# Patient Record
(demographics unavailable — no encounter records)

---

## 2024-10-09 NOTE — ASSESSMENT
[FreeTextEntry1] : Assessment: Peroneal tendinitis, left foot.  Plan:   After further assessment the left foot was placed in approximately 90 degrees to the ankle with the foot in as close to neutral position as possible.  The foot and ankle were prepped to be clean and dry.  Pre tape spray may have been used on the foot and ankle as indicated.  Pre-tape adhesive spray provides an extra sticky, waterproof barrier, that helps tapes and strapping stick better to the skin. Perfect for oily skin, sweaty skin, water sports and extreme environments. It can be a game-changer when strapping challenging areas such as sweaty or wet ankles & feet. I then applied a strapping to the left foot and ankle for support and compression to help relieve pressure and symptoms related to the patient's foot/ankle problem.   The strapping can aid recovery by supporting the muscles, tendons, or ligaments around the affected painful area, as well as improving blood flow to the area. Plus, the strapping will reduce pain and improve the gait, preventing further injury from poor alignment. The strapping also provides proprioceptive feedback. The strapping can reduce the amount of stretching and moving the ligaments do when the patient is weight bearing. This not only gives tissues a better chance to heal, but it also helps prevent further damage. This strapping also minimizes foot pronation, collapse, or flattening which causes over use and irritation to the muscle, tendons, and ligaments in the foot. Often, relief of pain with the strapping is a diagnostic indication for the need of functional orthotic devices. In general, strappings may be used to treat strains, sprains, dislocations, and some fractures. Strapping the foot and ankle provides the external stabilization that stretched ligaments (tissues connecting bone to bone) need while they heal. Most studies show that strapping the foot and ankle decreases the incidence and severity of the affected area.  Besides physical support, strapping can increase biofeedback and increase proprioception, which is a body's ability to know where it is thus aiding in healing and reducing further exacerbation of the pain. The patient was instructed to leave the strapping in place for the next few days to a week, if it was comfortable.  The patient was advised to keep the strapping dry, but leave it on and dry with a hair dryer if it got wet.   The patient was further instructed that if the strapping was uncomfortable or causes any problems it should be removed right away and the office notified.  The patient was instructed to rest, ice and elevate the foot as much as possible.  The patient was instructed about the potential benefits of physical therapy as a conservative means of treating this condition, either independently, or as an adjunct to other conservative treatment.  The patient was instructed that physical therapy is considered therapeutic when performed 2-3 times per week. Treatment regimens such as, injection therapy, strapping, oral anti-inflammatory medications and the usage of orthotics were all discussed with the patient in order to establish an appropriate treatment plan with the patient's input.  PTR:  As needed.

## 2024-10-09 NOTE — PHYSICAL EXAM
[Ankle Swelling (On Exam)] : present [Ankle Swelling Bilaterally] : bilaterally  [Ankle Swelling On The Left] : moderate [Varicose Veins Of Lower Extremities] : not present [] : not present [Delayed in the Right Toes] : capillary refills normal in right toes [Delayed in the Left Toes] : capillary refills normal in the left toes [0] : left foot posterior tibialis 0 [1+] : left foot dorsalis pedis 1+ [TextEntry] : There is still pain with mild swelling proximal to the fifth metatarsal base on the left foot.  Ecchymosis is absent.

## 2024-10-09 NOTE — HISTORY OF PRESENT ILLNESS
[FreeTextEntry1] : Vinnie Plaza returns stating that he is doing better.  He rates the pain now as 3/10.  He states that he has been taking the meloxicam every day.  He states that after he is on his feet a lot, they do hurt, but day to day, it is tolerable.  He states that he has been wrapping the foot also which he finds limits the swelling and reduces the pain.